# Patient Record
Sex: FEMALE | Race: WHITE
[De-identification: names, ages, dates, MRNs, and addresses within clinical notes are randomized per-mention and may not be internally consistent; named-entity substitution may affect disease eponyms.]

---

## 2022-02-28 ENCOUNTER — HOSPITAL ENCOUNTER (OUTPATIENT)
Dept: HOSPITAL 76 - LAB | Age: 62
Discharge: HOME | End: 2022-02-28
Attending: INTERNAL MEDICINE
Payer: COMMERCIAL

## 2022-02-28 DIAGNOSIS — E78.5: Primary | ICD-10-CM

## 2022-02-28 DIAGNOSIS — I47.1: ICD-10-CM

## 2022-02-28 DIAGNOSIS — Z13.1: ICD-10-CM

## 2022-02-28 DIAGNOSIS — E55.9: ICD-10-CM

## 2022-02-28 LAB
ALBUMIN DIAFP-MCNC: 4.8 G/DL (ref 3.2–5.5)
ALBUMIN/GLOB SERPL: 1.5 {RATIO} (ref 1–2.2)
ALP SERPL-CCNC: 76 IU/L (ref 42–121)
ALT SERPL W P-5'-P-CCNC: 47 IU/L (ref 10–60)
ANION GAP SERPL CALCULATED.4IONS-SCNC: 9 MMOL/L (ref 6–13)
AST SERPL W P-5'-P-CCNC: 52 IU/L (ref 10–42)
BASOPHILS NFR BLD AUTO: 0.1 10^3/UL (ref 0–0.1)
BASOPHILS NFR BLD AUTO: 1.1 %
BILIRUB BLD-MCNC: 0.8 MG/DL (ref 0.2–1)
BUN SERPL-MCNC: 12 MG/DL (ref 6–20)
CALCIUM UR-MCNC: 9.2 MG/DL (ref 8.5–10.3)
CHLORIDE SERPL-SCNC: 99 MMOL/L (ref 101–111)
CHOLEST SERPL-MCNC: 228 MG/DL
CO2 SERPL-SCNC: 26 MMOL/L (ref 21–32)
CREAT SERPLBLD-SCNC: 0.8 MG/DL (ref 0.4–1)
EOSINOPHIL # BLD AUTO: 0.1 10^3/UL (ref 0–0.7)
EOSINOPHIL NFR BLD AUTO: 1.4 %
ERYTHROCYTE [DISTWIDTH] IN BLOOD BY AUTOMATED COUNT: 13.4 % (ref 12–15)
EST. AVERAGE GLUCOSE BLD GHB EST-MCNC: 100 MG/DL (ref 70–100)
GFRSERPLBLD MDRD-ARVRAT: 73 ML/MIN/{1.73_M2} (ref 89–?)
GLOBULIN SER-MCNC: 3.3 G/DL (ref 2.1–4.2)
GLUCOSE SERPL-MCNC: 197 MG/DL (ref 70–100)
HBA1C MFR BLD HPLC: 5.1 % (ref 4.27–6.07)
HCT VFR BLD AUTO: 43.4 % (ref 37–47)
HDLC SERPL-MCNC: 87 MG/DL
HDLC SERPL: 2.6 {RATIO} (ref ?–4.4)
HGB UR QL STRIP: 14.7 G/DL (ref 12–16)
LDLC SERPL CALC-MCNC: 83 MG/DL
LDLC/HDLC SERPL: 1 {RATIO} (ref ?–4.4)
LYMPHOCYTES # SPEC AUTO: 1.3 10^3/UL (ref 1.5–3.5)
LYMPHOCYTES NFR BLD AUTO: 23.8 %
MCH RBC QN AUTO: 32.2 PG (ref 27–31)
MCHC RBC AUTO-ENTMCNC: 33.9 G/DL (ref 32–36)
MCV RBC AUTO: 95.2 FL (ref 81–99)
MONOCYTES # BLD AUTO: 0.4 10^3/UL (ref 0–1)
MONOCYTES NFR BLD AUTO: 7.1 %
NEUTROPHILS # BLD AUTO: 3.8 10^3/UL (ref 1.5–6.6)
NEUTROPHILS # SNV AUTO: 5.6 X10^3/UL (ref 4.8–10.8)
NEUTROPHILS NFR BLD AUTO: 66.4 %
NRBC # BLD AUTO: 0 /100WBC
NRBC # BLD AUTO: 0 X10^3/UL
PDW BLD AUTO: 9.1 FL (ref 7.9–10.8)
PLATELET # BLD: 246 10^3/UL (ref 130–450)
POTASSIUM SERPL-SCNC: 3.9 MMOL/L (ref 3.5–5)
PROT SPEC-MCNC: 8.1 G/DL (ref 6.7–8.2)
RBC MAR: 4.56 10^6/UL (ref 4.2–5.4)
SODIUM SERPLBLD-SCNC: 134 MMOL/L (ref 135–145)
TRIGL P FAST SERPL-MCNC: 289 MG/DL
TSH SERPL-ACNC: 1.13 UIU/ML (ref 0.34–5.6)
VIT B12 SERPL-MCNC: 751 PG/ML (ref 180–914)
VLDLC SERPL-SCNC: 58 MG/DL

## 2022-02-28 PROCEDURE — 85025 COMPLETE CBC W/AUTO DIFF WBC: CPT

## 2022-02-28 PROCEDURE — 82306 VITAMIN D 25 HYDROXY: CPT

## 2022-02-28 PROCEDURE — 83721 ASSAY OF BLOOD LIPOPROTEIN: CPT

## 2022-02-28 PROCEDURE — 80061 LIPID PANEL: CPT

## 2022-02-28 PROCEDURE — 36415 COLL VENOUS BLD VENIPUNCTURE: CPT

## 2022-02-28 PROCEDURE — 80053 COMPREHEN METABOLIC PANEL: CPT

## 2022-02-28 PROCEDURE — 83036 HEMOGLOBIN GLYCOSYLATED A1C: CPT

## 2022-02-28 PROCEDURE — 84443 ASSAY THYROID STIM HORMONE: CPT

## 2022-02-28 PROCEDURE — 82607 VITAMIN B-12: CPT

## 2023-01-13 ENCOUNTER — HOSPITAL ENCOUNTER (OUTPATIENT)
Dept: HOSPITAL 76 - LAB | Age: 63
Discharge: HOME | End: 2023-01-13
Attending: OPTOMETRIST
Payer: COMMERCIAL

## 2023-01-13 DIAGNOSIS — H50.05: Primary | ICD-10-CM

## 2023-01-13 DIAGNOSIS — G31.9: ICD-10-CM

## 2023-01-13 DIAGNOSIS — H53.2: ICD-10-CM

## 2023-01-13 DIAGNOSIS — S09.8XXA: ICD-10-CM

## 2023-01-13 LAB
ALBUMIN DIAFP-MCNC: 3.9 G/DL (ref 3.2–5.5)
ALBUMIN/GLOB SERPL: 1.3 {RATIO} (ref 1–2.2)
ALP SERPL-CCNC: 95 IU/L (ref 42–121)
ALT SERPL W P-5'-P-CCNC: 36 IU/L (ref 10–60)
ANION GAP SERPL CALCULATED.4IONS-SCNC: 11 MMOL/L (ref 6–13)
AST SERPL W P-5'-P-CCNC: 69 IU/L (ref 10–42)
BASOPHILS NFR BLD AUTO: 0 10^3/UL (ref 0–0.1)
BASOPHILS NFR BLD AUTO: 1 %
BILIRUB BLD-MCNC: 1.1 MG/DL (ref 0.2–1)
BUN SERPL-MCNC: 12 MG/DL (ref 6–20)
CALCIUM UR-MCNC: 8.6 MG/DL (ref 8.5–10.3)
CHLORIDE SERPL-SCNC: 100 MMOL/L (ref 101–111)
CHOLEST SERPL-MCNC: 242 MG/DL
CO2 SERPL-SCNC: 26 MMOL/L (ref 21–32)
CREAT SERPLBLD-SCNC: 0.6 MG/DL (ref 0.4–1)
CREAT SERPLBLD-SCNC: 0.6 MG/DL (ref 0.4–1)
CRP SERPL-MCNC: 2.8 MG/DL (ref 0–1)
EOSINOPHIL # BLD AUTO: 0.1 10^3/UL (ref 0–0.7)
EOSINOPHIL NFR BLD AUTO: 2 %
ERYTHROCYTE [DISTWIDTH] IN BLOOD BY AUTOMATED COUNT: 12.9 % (ref 12–15)
EST. AVERAGE GLUCOSE BLD GHB EST-MCNC: 97 MG/DL (ref 70–100)
GFRSERPLBLD MDRD-ARVRAT: 101 ML/MIN/{1.73_M2} (ref 89–?)
GFRSERPLBLD MDRD-ARVRAT: 101 ML/MIN/{1.73_M2} (ref 89–?)
GLOBULIN SER-MCNC: 3.1 G/DL (ref 2.1–4.2)
GLUCOSE SERPL-MCNC: 99 MG/DL (ref 70–100)
HBA1C MFR BLD HPLC: 5 % (ref 4.27–6.07)
HCT VFR BLD AUTO: 38.7 % (ref 37–47)
HDLC SERPL-MCNC: 79 MG/DL
HDLC SERPL: 3.1 {RATIO} (ref ?–4.4)
HGB UR QL STRIP: 12.7 G/DL (ref 12–16)
LDLC SERPL CALC-MCNC: 120 MG/DL
LDLC/HDLC SERPL: 1.5 {RATIO} (ref ?–4.4)
LYMPHOCYTES # SPEC AUTO: 1.1 10^3/UL (ref 1.5–3.5)
LYMPHOCYTES NFR BLD AUTO: 28.3 %
MCH RBC QN AUTO: 33.2 PG (ref 27–31)
MCHC RBC AUTO-ENTMCNC: 32.8 G/DL (ref 32–36)
MCV RBC AUTO: 101.3 FL (ref 81–99)
MONOCYTES # BLD AUTO: 0.4 10^3/UL (ref 0–1)
MONOCYTES NFR BLD AUTO: 8.9 %
NEUTROPHILS # BLD AUTO: 2.4 10^3/UL (ref 1.5–6.6)
NEUTROPHILS # SNV AUTO: 4 X10^3/UL (ref 4.8–10.8)
NEUTROPHILS NFR BLD AUTO: 59.6 %
NRBC # BLD AUTO: 0 /100WBC
NRBC # BLD AUTO: 0 X10^3/UL
PDW BLD AUTO: 8.5 FL (ref 7.9–10.8)
PLATELET # BLD: 171 10^3/UL (ref 130–450)
POTASSIUM SERPL-SCNC: 3.8 MMOL/L (ref 3.5–5)
PROT SPEC-MCNC: 7 G/DL (ref 6.7–8.2)
RBC MAR: 3.82 10^6/UL (ref 4.2–5.4)
SODIUM SERPLBLD-SCNC: 137 MMOL/L (ref 135–145)
TRIGL P FAST SERPL-MCNC: 215 MG/DL
VLDLC SERPL-SCNC: 43 MG/DL

## 2023-01-13 PROCEDURE — 83036 HEMOGLOBIN GLYCOSYLATED A1C: CPT

## 2023-01-13 PROCEDURE — 85025 COMPLETE CBC W/AUTO DIFF WBC: CPT

## 2023-01-13 PROCEDURE — 86225 DNA ANTIBODY NATIVE: CPT

## 2023-01-13 PROCEDURE — 70553 MRI BRAIN STEM W/O & W/DYE: CPT

## 2023-01-13 PROCEDURE — 70543 MRI ORBT/FAC/NCK W/O &W/DYE: CPT

## 2023-01-13 PROCEDURE — 85651 RBC SED RATE NONAUTOMATED: CPT

## 2023-01-13 PROCEDURE — 80053 COMPREHEN METABOLIC PANEL: CPT

## 2023-01-13 PROCEDURE — 80061 LIPID PANEL: CPT

## 2023-01-13 PROCEDURE — 83516 IMMUNOASSAY NONANTIBODY: CPT

## 2023-01-13 PROCEDURE — 84443 ASSAY THYROID STIM HORMONE: CPT

## 2023-01-13 PROCEDURE — 83721 ASSAY OF BLOOD LIPOPROTEIN: CPT

## 2023-01-13 PROCEDURE — 86140 C-REACTIVE PROTEIN: CPT

## 2023-01-13 PROCEDURE — 82565 ASSAY OF CREATININE: CPT

## 2023-01-13 PROCEDURE — 36415 COLL VENOUS BLD VENIPUNCTURE: CPT

## 2023-01-13 PROCEDURE — 86235 NUCLEAR ANTIGEN ANTIBODY: CPT

## 2023-01-13 PROCEDURE — 81599 UNLISTED MAAA: CPT

## 2023-01-13 NOTE — MRI REPORT
PROCEDURE:  MRI orbits with and without contrast

 

INDICATIONS:  NEW ONSET DIPLOPIA, ESOTROPIA, AND HEAD TRAUMA

 

CONTRAST: GADAVIST 10.9 ML 

 

TECHNIQUE:  

Noncontrast sagittal T1 spin echo, axial FLAIR, axial gradient echo, axial diffusion and ADC acquired
 through the brain.  Coronal STIR, thin-slice axial T1 spin echo through the orbits.  After the admin
istration of contrast, thin slice axial and coronal T1 spin echo with fat saturation through the orbi
ts, axial T1 spin echo with fat saturation through the brain.  

 

COMPARISON:  None.

 

FINDINGS:  

Image quality:  Excellent.  

 

Orbits:  Globes are symmetrical.  The optic nerves are normal in size, without abnormal signal or enh
ancement.  No retrobulbar masses or fat abnormalities.  The extra-ocular muscles are normal and symme
tric in appearance.  Lacrimal glands are normal.  Optic chiasm is normal.  Periorbital soft tissues a
ppear normal.  There is thinning of the intraocular lenses noted

 

CSF spaces:  Ventricles are normal in size and shape.  Basal cisterns are patent.  No extra-axial flu
id collections.  

 

Brain:  No intracranial bleeds or mass effects.  No abnormal intracranial enhancement.  Gray-white ma
tter interface is intact.  Diffusion weighted images demonstrate no acute ischemic insults.  Pituitar
y gland appears normal, without sellar or suprasellar masses.  Brainstem appears normal.  Normal intr
avascular flow voids are present.  

 

Skull and face:  Calvarial marrow is normal in signal.  

 

Sinuses:  Sinuses and mastoids are clear.  

 

IMPRESSION:  

Thinning of the intraocular lenses, could reflect bilateral intraocular lens replacements. Otherwise 
unremarkable MRI of the face and orbits

 

Reviewed by: Varghese Vu MD on 1/13/2023 12:03 PM [facility]

Approved by: Varghese Vu MD on 1/13/2023 12:03 PM [facility]

 

 

Station ID:  [id number]

## 2023-01-13 NOTE — MRI REPORT
PROCEDURE:  MRI brain with and without contrast

 

INDICATIONS:  NEW ONSET DIPLOPIA, ESOTROPIA, AND HEAD TRAUMA

 

CONTRAST: GADAVIST 10.9 ML 

                       

TECHNIQUE:  

Noncontrast axial T1 spin echo, axial T2 fast spin echo, sagittal and axial FLAIR, coronal T2 fast sp
in echo, axial gradient echo, axial diffusion and ADC through the brain.  After the administration of
 contrast, axial and coronal T1 spin echo with fat saturation through the brain.  

 

COMPARISON:  None.

 

FINDINGS:  

Image quality:  Excellent.  

 

CSF spaces:  Basal cisterns are patent.  No extra-axial fluid collections.  Ventricles are normal in 
size and shape.  

 

Brain:  No midline shift.  No intracranial bleeds or masses.  No abnormal intracranial enhancement. M
ild atrophy and multifocal white matter chronic ischemic change noted.  The brainstem appears normal.
  Diffusion-weighted images demonstrate no acute infarct.  Normal intravascular flow voids are presen
t.  

 

Skull and face:  Calvarial marrow is normal in signal.  Orbits appear normal. Incidental thinning of 
both intraocular lenses noted

 

Sinuses:  Sinuses and mastoids appear clear.  

 

IMPRESSION:  

 

Mild atrophy and white matter chronic ischemic change without acute infarct, hemorrhage or mass lesio
n.

 

Thinning of both ocular lens may reflect intraocular lens replacements

 

Reviewed by: Varghese Vu MD on 1/13/2023 11:40 AM [facility]

Approved by: Varghese Vu MD on 1/13/2023 11:40 AM [facility]

 

 

Station ID:  [id number]

## 2023-01-16 LAB
CENTROMERE B AB SER-ACNC: <0.2 AI (ref 0–0.9)
CHROMATIN AB SERPL-ACNC: <0.2 AI (ref 0–0.9)
DSDNA AB SER-ACNC: <1 IU/ML (ref 0–9)
ENA JO1 AB SER-ACNC: <0.2 AI (ref 0–0.9)
ENA RNP AB SER-ACNC: 0.4 AI (ref 0–0.9)
ENA SCL70 AB SER-ACNC: <0.2 AI (ref 0–0.9)
ENA SM AB SER-ACNC: <0.2 AI (ref 0–0.9)
ENA SM+RNP AB SER-ACNC: <0.2 AI (ref 0–0.9)
ENA SS-A AB SER-ACNC: <0.2 AI (ref 0–0.9)
ENA SS-B AB SER-ACNC: <0.2 AI (ref 0–0.9)
RIBOSOMAL P AB SER-ACNC: <0.2 AI (ref 0–0.9)

## 2023-02-13 ENCOUNTER — HOSPITAL ENCOUNTER (OUTPATIENT)
Dept: HOSPITAL 76 - EMS | Age: 63
End: 2023-02-13
Payer: COMMERCIAL

## 2023-02-13 DIAGNOSIS — Z03.89: Primary | ICD-10-CM

## 2023-06-09 ENCOUNTER — HOSPITAL ENCOUNTER (OUTPATIENT)
Dept: HOSPITAL 76 - LAB | Age: 63
Discharge: HOME | End: 2023-06-09
Attending: INTERNAL MEDICINE
Payer: COMMERCIAL

## 2023-06-09 DIAGNOSIS — E78.5: ICD-10-CM

## 2023-06-09 DIAGNOSIS — F10.21: Primary | ICD-10-CM

## 2023-06-09 DIAGNOSIS — Z13.1: ICD-10-CM

## 2023-06-09 LAB
ALBUMIN DIAFP-MCNC: 4.1 G/DL (ref 3.2–5.5)
ALBUMIN/GLOB SERPL: 1.3 {RATIO} (ref 1–2.2)
ALP SERPL-CCNC: 91 IU/L (ref 42–121)
ALT SERPL W P-5'-P-CCNC: 25 IU/L (ref 10–60)
ANION GAP SERPL CALCULATED.4IONS-SCNC: 7 MMOL/L (ref 6–13)
AST SERPL W P-5'-P-CCNC: 27 IU/L (ref 10–42)
BASOPHILS NFR BLD AUTO: 0 10^3/UL (ref 0–0.1)
BASOPHILS NFR BLD AUTO: 0.8 %
BILIRUB BLD-MCNC: 0.5 MG/DL (ref 0.2–1)
BUN SERPL-MCNC: 14 MG/DL (ref 6–20)
CALCIUM UR-MCNC: 8.9 MG/DL (ref 8.5–10.3)
CHLORIDE SERPL-SCNC: 106 MMOL/L (ref 101–111)
CHOLEST SERPL-MCNC: 208 MG/DL
CO2 SERPL-SCNC: 29 MMOL/L (ref 21–32)
CREAT SERPLBLD-SCNC: 1 MG/DL (ref 0.4–1)
EOSINOPHIL # BLD AUTO: 0.1 10^3/UL (ref 0–0.7)
EOSINOPHIL NFR BLD AUTO: 1.7 %
ERYTHROCYTE [DISTWIDTH] IN BLOOD BY AUTOMATED COUNT: 12.6 % (ref 12–15)
EST. AVERAGE GLUCOSE BLD GHB EST-MCNC: 120 MG/DL (ref 70–100)
FOLATE SERPL-MCNC: 28 NG/ML
GFRSERPLBLD MDRD-ARVRAT: 56 ML/MIN/{1.73_M2} (ref 89–?)
GLOBULIN SER-MCNC: 3.2 G/DL (ref 2.1–4.2)
GLUCOSE SERPL-MCNC: 97 MG/DL (ref 70–100)
HBA1C MFR BLD HPLC: 5.8 % (ref 4.27–6.07)
HCT VFR BLD AUTO: 40.1 % (ref 37–47)
HDLC SERPL-MCNC: 61 MG/DL
HDLC SERPL: 3.4 {RATIO} (ref ?–4.4)
HGB UR QL STRIP: 13.2 G/DL (ref 12–16)
LDLC SERPL CALC-MCNC: 91 MG/DL
LDLC/HDLC SERPL: 1.5 {RATIO} (ref ?–4.4)
LYMPHOCYTES # SPEC AUTO: 1.4 10^3/UL (ref 1.5–3.5)
LYMPHOCYTES NFR BLD AUTO: 30.1 %
MCH RBC QN AUTO: 31.1 PG (ref 27–31)
MCHC RBC AUTO-ENTMCNC: 32.9 G/DL (ref 32–36)
MCV RBC AUTO: 94.6 FL (ref 81–99)
MONOCYTES # BLD AUTO: 0.3 10^3/UL (ref 0–1)
MONOCYTES NFR BLD AUTO: 7 %
NEUTROPHILS # BLD AUTO: 2.8 10^3/UL (ref 1.5–6.6)
NEUTROPHILS # SNV AUTO: 4.7 X10^3/UL (ref 4.8–10.8)
NEUTROPHILS NFR BLD AUTO: 60 %
NRBC # BLD AUTO: 0 /100WBC
NRBC # BLD AUTO: 0 X10^3/UL
PDW BLD AUTO: 9.1 FL (ref 7.9–10.8)
PLATELET # BLD: 225 10^3/UL (ref 130–450)
POTASSIUM SERPL-SCNC: 4.1 MMOL/L (ref 3.5–5)
PROT SPEC-MCNC: 7.3 G/DL (ref 6.7–8.2)
RBC MAR: 4.24 10^6/UL (ref 4.2–5.4)
SODIUM SERPLBLD-SCNC: 142 MMOL/L (ref 135–145)
TRIGL P FAST SERPL-MCNC: 282 MG/DL
VIT B12 SERPL-MCNC: 963 PG/ML (ref 180–914)
VLDLC SERPL-SCNC: 56 MG/DL

## 2023-06-09 PROCEDURE — 82746 ASSAY OF FOLIC ACID SERUM: CPT

## 2023-06-09 PROCEDURE — 80053 COMPREHEN METABOLIC PANEL: CPT

## 2023-06-09 PROCEDURE — 85025 COMPLETE CBC W/AUTO DIFF WBC: CPT

## 2023-06-09 PROCEDURE — 80061 LIPID PANEL: CPT

## 2023-06-09 PROCEDURE — 36415 COLL VENOUS BLD VENIPUNCTURE: CPT

## 2023-06-09 PROCEDURE — 82607 VITAMIN B-12: CPT

## 2023-06-09 PROCEDURE — 83036 HEMOGLOBIN GLYCOSYLATED A1C: CPT

## 2023-06-09 PROCEDURE — 83721 ASSAY OF BLOOD LIPOPROTEIN: CPT
